# Patient Record
(demographics unavailable — no encounter records)

---

## 2025-05-09 NOTE — HISTORY OF PRESENT ILLNESS
[Lower back] : lower back [Dull/Aching] : dull/aching [Radiating] : radiating [Shooting] : shooting [Constant] : constant [] : yes [Retired] : Work status: retired

## 2025-05-09 NOTE — PHYSICAL EXAM
[4___] : left hip flexion 4[unfilled]/5 [5___] : right plantor flexors 5[unfilled]/5 [] : patient ambulates without assistive device

## 2025-05-16 NOTE — DISCUSSION/SUMMARY
[de-identified] : Rest, ice, heat, activity modification.  Patient defers LSO brace.  Lido patches. Ibuprofen 600mg BID pain.  Tramadol 50mg BID prn breakthrough pain.  Follow up with spine specialist next week.   She reports she already has a scheduled appointment.

## 2025-05-16 NOTE — HISTORY OF PRESENT ILLNESS
[de-identified] : 5/16/25:   78 y/o F here with lower back pain with radiating pain down LLE. Saw ANURADHA Candelario on 5/9/25 - received a Toradol inj with relief for 1-2 days. Here today because temporary relief with injection and MDP.  Continued pain in lower back.  No new injury or trauma.   Occ shooting pain down the left leg at times.  No numbness or tingling.   Rest and Tramadol prn with little relief.   In addition, patient reports that she received a left trigger point injection 2 weeks ago without outside doc with little relief.   [de-identified] : 5/9/25

## 2025-05-21 NOTE — HISTORY OF PRESENT ILLNESS
[10] : 10 [] : yes [Meds] : meds [de-identified] : 5.21.25:  80 YO f -  Patient here for lower back pain. Patient states pain started 3 weeks ago, no injury.  Started abruptly with severe    Went into urgent care and was seen there and given toradol injection Tried oral steroid pack  Was given tramadol  tried tyelnol/ibuprofen  tried lidocaine patches   Had lubmar surgery done around 2012   Had right knee replacement   MRI l [facility] - 5/8/25 - Multilevel lumbar spine degenerative changes, as detailed above. Reference is made to 12/17/2012. Left hemilaminectomy defect at L2-L3. Apparent broad-based left paracentral disc herniation at L2-L3 which abuts the traversing left L3 nerve root. This may in part be related to postoperative granulation tissue. Consider follow-up gadolinium-enhanced MRI of the lumbar spine for further characterization. Mild lumbar dextroscoliosis. by my read- likely L3-4 disc herniation  XRAYS TODAY: [facility] - severe lumbar spondylosis, scoliosis  ap pelvis - negative   HLD/HTN/DM No hx of cancer   [FreeTextEntry7] : left leg

## 2025-05-21 NOTE — DISCUSSION/SUMMARY
[Medication Risks Reviewed] : Medication risks reviewed [Surgical risks reviewed] : Surgical risks reviewed [de-identified] : reviewed the case and the imaging with the patient  LBP with what looks like left sided L3-4 extruded disc  discussion of the condition and treatment options cautions discussed questions answered discussion of natural history of the condition and what the next step would be PT/gabapentin/referral to PM light activity if that not working consider surgery which would be lami fusion L3-4